# Patient Record
Sex: MALE | Race: WHITE | NOT HISPANIC OR LATINO | Employment: FULL TIME | URBAN - METROPOLITAN AREA
[De-identification: names, ages, dates, MRNs, and addresses within clinical notes are randomized per-mention and may not be internally consistent; named-entity substitution may affect disease eponyms.]

---

## 2023-06-27 PROBLEM — J06.9 VIRAL UPPER RESPIRATORY TRACT INFECTION: Status: ACTIVE | Noted: 2023-06-27

## 2023-06-27 PROBLEM — R07.9 CHEST PAIN: Status: ACTIVE | Noted: 2023-06-27

## 2023-06-27 PROBLEM — R19.5 CHANGE IN STOOL: Status: ACTIVE | Noted: 2023-06-27

## 2023-06-27 PROBLEM — A74.9 CHLAMYDIA: Status: ACTIVE | Noted: 2023-06-27

## 2023-06-27 PROBLEM — A64: Status: ACTIVE | Noted: 2023-06-27

## 2023-06-27 PROBLEM — E03.9 ACQUIRED HYPOTHYROIDISM: Status: ACTIVE | Noted: 2023-06-27

## 2023-06-27 PROBLEM — G43.909 HEADACHE, MIGRAINE: Status: ACTIVE | Noted: 2023-06-27

## 2023-06-27 PROBLEM — N48.9 PENILE LESION: Status: ACTIVE | Noted: 2023-06-27

## 2023-06-27 PROBLEM — R22.1 LUMP IN THROAT: Status: ACTIVE | Noted: 2023-06-27

## 2023-06-27 PROBLEM — L80 VITILIGO: Status: ACTIVE | Noted: 2023-06-27

## 2023-06-27 PROBLEM — R63.5 WEIGHT GAIN: Status: ACTIVE | Noted: 2023-06-27

## 2023-06-27 PROBLEM — A53.9 SYPHILIS: Status: ACTIVE | Noted: 2023-06-27

## 2023-06-27 PROBLEM — F32.A DEPRESSION: Status: ACTIVE | Noted: 2023-06-27

## 2023-06-29 ENCOUNTER — OFFICE VISIT (OUTPATIENT)
Dept: PRIMARY CARE | Facility: CLINIC | Age: 23
End: 2023-06-29
Payer: COMMERCIAL

## 2023-06-29 VITALS
DIASTOLIC BLOOD PRESSURE: 68 MMHG | OXYGEN SATURATION: 98 % | WEIGHT: 168 LBS | HEIGHT: 63 IN | SYSTOLIC BLOOD PRESSURE: 108 MMHG | BODY MASS INDEX: 29.77 KG/M2 | HEART RATE: 65 BPM | TEMPERATURE: 97.9 F

## 2023-06-29 DIAGNOSIS — R09.A2 SENSATION OF LUMP IN THROAT: Primary | ICD-10-CM

## 2023-06-29 DIAGNOSIS — R06.02 SOB (SHORTNESS OF BREATH): ICD-10-CM

## 2023-06-29 DIAGNOSIS — Z00.00 HEALTHCARE MAINTENANCE: ICD-10-CM

## 2023-06-29 DIAGNOSIS — F43.21 ADJUSTMENT DISORDER WITH DEPRESSED MOOD: ICD-10-CM

## 2023-06-29 PROCEDURE — 1036F TOBACCO NON-USER: CPT | Performed by: STUDENT IN AN ORGANIZED HEALTH CARE EDUCATION/TRAINING PROGRAM

## 2023-06-29 PROCEDURE — 99213 OFFICE O/P EST LOW 20 MIN: CPT | Performed by: STUDENT IN AN ORGANIZED HEALTH CARE EDUCATION/TRAINING PROGRAM

## 2023-06-29 ASSESSMENT — ENCOUNTER SYMPTOMS
SHORTNESS OF BREATH: 0
SORE THROAT: 0
VOMITING: 0
VOICE CHANGE: 0
TROUBLE SWALLOWING: 1
DYSURIA: 0
DIARRHEA: 0
SINUS PRESSURE: 0
SINUS PAIN: 0
RHINORRHEA: 0
NAUSEA: 0
DIZZINESS: 0
LIGHT-HEADEDNESS: 0

## 2023-06-29 ASSESSMENT — PATIENT HEALTH QUESTIONNAIRE - PHQ9
SUM OF ALL RESPONSES TO PHQ QUESTIONS 1-9: 13
10. IF YOU CHECKED OFF ANY PROBLEMS, HOW DIFFICULT HAVE THESE PROBLEMS MADE IT FOR YOU TO DO YOUR WORK, TAKE CARE OF THINGS AT HOME, OR GET ALONG WITH OTHER PEOPLE: SOMEWHAT DIFFICULT
8. MOVING OR SPEAKING SO SLOWLY THAT OTHER PEOPLE COULD HAVE NOTICED. OR THE OPPOSITE, BEING SO FIGETY OR RESTLESS THAT YOU HAVE BEEN MOVING AROUND A LOT MORE THAN USUAL: NOT AT ALL
2. FEELING DOWN, DEPRESSED OR HOPELESS: NEARLY EVERY DAY
7. TROUBLE CONCENTRATING ON THINGS, SUCH AS READING THE NEWSPAPER OR WATCHING TELEVISION: NOT AT ALL
1. LITTLE INTEREST OR PLEASURE IN DOING THINGS: NEARLY EVERY DAY
3. TROUBLE FALLING OR STAYING ASLEEP OR SLEEPING TOO MUCH: NOT AT ALL
5. POOR APPETITE OR OVEREATING: NEARLY EVERY DAY
4. FEELING TIRED OR HAVING LITTLE ENERGY: SEVERAL DAYS
6. FEELING BAD ABOUT YOURSELF - OR THAT YOU ARE A FAILURE OR HAVE LET YOURSELF OR YOUR FAMILY DOWN: NEARLY EVERY DAY
SUM OF ALL RESPONSES TO PHQ9 QUESTIONS 1 AND 2: 6
9. THOUGHTS THAT YOU WOULD BE BETTER OFF DEAD, OR OF HURTING YOURSELF: NOT AT ALL

## 2023-06-29 NOTE — ASSESSMENT & PLAN NOTE
He will let us know how he is doing with mood given his dad just  in February and he is dealing with the legal aftereffects of this.

## 2023-06-29 NOTE — ASSESSMENT & PLAN NOTE
Referred to ENT given the sensation of lump in the throat.  If work-up with ENT is largely unremarkable may need to refer to GI.

## 2023-06-29 NOTE — ASSESSMENT & PLAN NOTE
Seems to be related to airway irritation from marijuana use.  Advised to stop using marijuana.  Able to ambulate 2 flights of stairs without difficulty which is reassuring.  Let us know if this is persistent.

## 2023-06-29 NOTE — PROGRESS NOTES
"Subjective   Patient ID: Kaleb Hunt is a 23 y.o. male who presents for Sore Throat.  He is concerned about his throat and has hx enlarged tonsils. Now he feels lump in his throat and swallowing pill causes it to get caught. All swelling is in inside throat. Present for a few months. Sometimes food feels like it gets stuck at opening of mouth and proximal neck. No weight changes. Has FELDMAN and SOB when lying flat and uses marijuana before bed. No issues walking up 2 flights of stairs. No SI/HI.         Review of Systems   HENT:  Positive for trouble swallowing. Negative for hearing loss, postnasal drip, rhinorrhea, sinus pressure, sinus pain, sore throat, tinnitus and voice change.         No smell changes   Eyes:  Negative for visual disturbance.   Respiratory:  Negative for shortness of breath.    Cardiovascular:  Negative for chest pain.   Gastrointestinal:  Negative for diarrhea, nausea and vomiting.   Genitourinary:  Negative for dysuria.   Skin:  Negative for rash.   Neurological:  Negative for dizziness and light-headedness.       /68   Pulse 65   Temp 36.6 °C (97.9 °F)   Ht 1.607 m (5' 3.25\")   Wt 76.2 kg (168 lb)   SpO2 98%   BMI 29.53 kg/m²     Objective   Physical Exam  Vitals reviewed.   Constitutional:       General: He is not in acute distress.     Appearance: Normal appearance.   HENT:      Head: Normocephalic.      Right Ear: Tympanic membrane, ear canal and external ear normal. There is no impacted cerumen.      Left Ear: Tympanic membrane, ear canal and external ear normal. There is no impacted cerumen.      Mouth/Throat:      Mouth: Mucous membranes are moist.      Pharynx: Oropharynx is clear. No oropharyngeal exudate or posterior oropharyngeal erythema.      Comments: Bilateral enlarged tonsils.  Cardiovascular:      Rate and Rhythm: Normal rate and regular rhythm.   Pulmonary:      Effort: Pulmonary effort is normal. No respiratory distress.      Breath sounds: Normal breath " sounds.   Abdominal:      General: There is no distension.   Musculoskeletal:         General: No deformity.      Cervical back: Neck supple. No tenderness.   Lymphadenopathy:      Cervical: No cervical adenopathy.   Skin:     Coloration: Skin is not jaundiced.   Neurological:      Mental Status: He is alert.         Assessment/Plan   Problem List Items Addressed This Visit       Sensation of lump in throat - Primary     Referred to ENT given the sensation of lump in the throat.  If work-up with ENT is largely unremarkable may need to refer to GI.          Relevant Orders    Referral to ENT    Healthcare maintenance     Follow-up with me in 1 to 3 months for physical or sooner if needed.          Adjustment disorder with depressed mood      He will let us know how he is doing with mood given his dad just  in February and he is dealing with the legal aftereffects of this.          SOB (shortness of breath)     Seems to be related to airway irritation from marijuana use.  Advised to stop using marijuana.  Able to ambulate 2 flights of stairs without difficulty which is reassuring.  Let us know if this is persistent.

## 2023-10-04 ENCOUNTER — APPOINTMENT (OUTPATIENT)
Dept: PRIMARY CARE | Facility: CLINIC | Age: 23
End: 2023-10-04
Payer: COMMERCIAL

## 2023-11-06 ENCOUNTER — LAB (OUTPATIENT)
Dept: LAB | Facility: LAB | Age: 23
End: 2023-11-06
Payer: COMMERCIAL

## 2023-11-06 ENCOUNTER — OFFICE VISIT (OUTPATIENT)
Dept: PRIMARY CARE | Facility: CLINIC | Age: 23
End: 2023-11-06
Payer: COMMERCIAL

## 2023-11-06 VITALS
RESPIRATION RATE: 16 BRPM | HEART RATE: 83 BPM | SYSTOLIC BLOOD PRESSURE: 104 MMHG | DIASTOLIC BLOOD PRESSURE: 76 MMHG | WEIGHT: 163 LBS | HEIGHT: 63 IN | OXYGEN SATURATION: 98 % | BODY MASS INDEX: 28.88 KG/M2

## 2023-11-06 DIAGNOSIS — R22.2 MASS OF SKIN OF BACK: ICD-10-CM

## 2023-11-06 DIAGNOSIS — N50.89 TESTICULAR SWELLING: ICD-10-CM

## 2023-11-06 DIAGNOSIS — Z00.00 ROUTINE HEALTH MAINTENANCE: ICD-10-CM

## 2023-11-06 DIAGNOSIS — Z80.0 FAMILY HISTORY OF COLON CANCER IN MOTHER: Primary | ICD-10-CM

## 2023-11-06 DIAGNOSIS — F43.21 ADJUSTMENT DISORDER WITH DEPRESSED MOOD: ICD-10-CM

## 2023-11-06 LAB
ALBUMIN SERPL BCP-MCNC: 4.4 G/DL (ref 3.4–5)
ALP SERPL-CCNC: 66 U/L (ref 33–120)
ALT SERPL W P-5'-P-CCNC: 23 U/L (ref 10–52)
ANION GAP SERPL CALC-SCNC: 10 MMOL/L (ref 10–20)
AST SERPL W P-5'-P-CCNC: 22 U/L (ref 9–39)
BILIRUB SERPL-MCNC: 0.4 MG/DL (ref 0–1.2)
BUN SERPL-MCNC: 11 MG/DL (ref 6–23)
CALCIUM SERPL-MCNC: 9.7 MG/DL (ref 8.6–10.3)
CHLORIDE SERPL-SCNC: 102 MMOL/L (ref 98–107)
CHOLEST SERPL-MCNC: 197 MG/DL (ref 0–199)
CHOLESTEROL/HDL RATIO: 3.8
CO2 SERPL-SCNC: 27 MMOL/L (ref 21–32)
CREAT SERPL-MCNC: 0.8 MG/DL (ref 0.5–1.3)
ERYTHROCYTE [DISTWIDTH] IN BLOOD BY AUTOMATED COUNT: 12.8 % (ref 11.5–14.5)
GFR SERPL CREATININE-BSD FRML MDRD: >90 ML/MIN/1.73M*2
GLUCOSE SERPL-MCNC: 89 MG/DL (ref 74–99)
HCT VFR BLD AUTO: 45.8 % (ref 41–52)
HDLC SERPL-MCNC: 52.3 MG/DL
HGB BLD-MCNC: 15 G/DL (ref 13.5–17.5)
LDLC SERPL CALC-MCNC: 119 MG/DL
MCH RBC QN AUTO: 29.2 PG (ref 26–34)
MCHC RBC AUTO-ENTMCNC: 32.8 G/DL (ref 32–36)
MCV RBC AUTO: 89 FL (ref 80–100)
NON HDL CHOLESTEROL: 145 MG/DL (ref 0–149)
NRBC BLD-RTO: 0 /100 WBCS (ref 0–0)
PLATELET # BLD AUTO: 290 X10*3/UL (ref 150–450)
POTASSIUM SERPL-SCNC: 4.2 MMOL/L (ref 3.5–5.3)
PROT SERPL-MCNC: 7.5 G/DL (ref 6.4–8.2)
RBC # BLD AUTO: 5.14 X10*6/UL (ref 4.5–5.9)
SODIUM SERPL-SCNC: 135 MMOL/L (ref 136–145)
TRIGL SERPL-MCNC: 129 MG/DL (ref 0–149)
TSH SERPL-ACNC: 1.65 MIU/L (ref 0.44–3.98)
VLDL: 26 MG/DL (ref 0–40)
WBC # BLD AUTO: 10 X10*3/UL (ref 4.4–11.3)

## 2023-11-06 PROCEDURE — 1036F TOBACCO NON-USER: CPT | Performed by: STUDENT IN AN ORGANIZED HEALTH CARE EDUCATION/TRAINING PROGRAM

## 2023-11-06 PROCEDURE — 80053 COMPREHEN METABOLIC PANEL: CPT

## 2023-11-06 PROCEDURE — 36415 COLL VENOUS BLD VENIPUNCTURE: CPT

## 2023-11-06 PROCEDURE — 85027 COMPLETE CBC AUTOMATED: CPT

## 2023-11-06 PROCEDURE — 84443 ASSAY THYROID STIM HORMONE: CPT

## 2023-11-06 PROCEDURE — 80061 LIPID PANEL: CPT

## 2023-11-06 PROCEDURE — 99214 OFFICE O/P EST MOD 30 MIN: CPT | Performed by: STUDENT IN AN ORGANIZED HEALTH CARE EDUCATION/TRAINING PROGRAM

## 2023-11-06 ASSESSMENT — ENCOUNTER SYMPTOMS
SHORTNESS OF BREATH: 0
CHILLS: 0
VOMITING: 0
LIGHT-HEADEDNESS: 0
FREQUENCY: 0
HEMATURIA: 0
FATIGUE: 1
DYSURIA: 0
DIZZINESS: 0
UNEXPECTED WEIGHT CHANGE: 1
DIARRHEA: 1
WEAKNESS: 0
NAUSEA: 0
DIAPHORESIS: 0
FEVER: 0

## 2023-11-06 NOTE — PROGRESS NOTES
"Subjective   Patient ID: Klaeb Hunt is a 23 y.o. male who presents for Mass.    Pt has c.o a mass on his right lower back. He states that there is pain when he presses on the area. Pt noticed this 2 weeks ago. He was doing more heavy lifting at work and was hoping this would go away. Also reports hx of fatty lump on left upper arm. It is a right lower lumbar region. No weakness in legs. Sometimes left arm feels intermittently numb, no pain. No numbness in the legs. If sitting legs crossed he will notice numbness. Doesn't like eating during work and eats after work larger amounts. No melena, no hematochezia.     Has also noticed testicular swelling and prominence that has been chronic for a few years and unchanged. No pain in testes and no other symptoms he is aware of. Denies any new STI concerns today.     Sleeps 6-7 hours of sleep at night, doesn't wake up at night.          Review of Systems   Constitutional:  Positive for fatigue and unexpected weight change (weight loss-usually unintentional, eating less than usual). Negative for chills, diaphoresis and fever.   HENT:  Negative for hearing loss.    Eyes:  Negative for visual disturbance.   Respiratory:  Negative for shortness of breath.    Cardiovascular:  Negative for chest pain.   Gastrointestinal:  Positive for diarrhea (related to change in eating habits.). Negative for nausea and vomiting.   Endocrine: Negative for cold intolerance and heat intolerance.   Genitourinary:  Positive for scrotal swelling. Negative for dysuria, frequency, hematuria, penile discharge, penile pain, penile swelling, testicular pain and urgency.   Skin:  Negative for rash.   Neurological:  Negative for dizziness, weakness and light-headedness.       Objective   /76   Pulse 83   Resp 16   Ht 1.6 m (5' 3\")   Wt 73.9 kg (163 lb)   SpO2 98%   BMI 28.87 kg/m²     Physical Exam  Vitals reviewed. Chaperone present: Offered and he politely declined.   Constitutional:     "   General: He is not in acute distress.     Appearance: Normal appearance.   HENT:      Head: Normocephalic.   Cardiovascular:      Rate and Rhythm: Normal rate and regular rhythm.   Pulmonary:      Effort: Pulmonary effort is normal. No respiratory distress.      Breath sounds: Normal breath sounds.   Abdominal:      General: There is no distension.   Genitourinary:     Penis: Normal.       Testes: Normal.      Comments: Mild prominence of testes bilaterally, no discrete masses palpated. No genital lesions or discharge present on exam. No inguinal hernias present bilaterally.   Musculoskeletal:         General: No deformity.   Skin:     Coloration: Skin is not jaundiced.      Comments: 2cm soft and somewhat mobile mass of right lateral lumbar region. No crepitus, no fluctuance,  no erythema.   Neurological:      Mental Status: He is alert.         Assessment/Plan   Problem List Items Addressed This Visit       Adjustment disorder with depressed mood    Testicular swelling    Relevant Orders    US scrotum    Mass of skin of back    Relevant Orders    CT abdomen pelvis w and wo IV contrast    Routine health maintenance    Relevant Orders    Lipid Panel (Completed)    Comprehensive Metabolic Panel (Completed)    CBC (Completed)    TSH with reflex to Free T4 if abnormal (Completed)    Family history of colon cancer in mother - Primary    Relevant Orders    Referral to Gastroenterology     Scrotal swelling  -Likely physiologic-will get u/s scrotum to confirm.   -ER for testicular pain or worsening symptoms.     Low back mass  -Discussed seems c/w lipoma, will get CT to confirm. Discussed could watch and see or get ultrasound though he is interested in CT given family hx.  Sensation of lump in throat  -Previously referred to ENT, these symptoms actually resolved.     Family history of colon cancer  -Referred to GI previously, re referred today    Adjustment disorder  -Mood is better since after his dad passed. Will let  us notice of any concerns here.    SOB  -Advised to stop marijuana previously   -Resolved.     F/u 1 month CPE  Labs ordered.     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 Living at home with his mom. Mom had colon cancer, had surgery and was benign, doing well.

## 2023-11-08 DIAGNOSIS — E87.1 HYPONATREMIA: Primary | ICD-10-CM

## 2023-11-28 ENCOUNTER — HOSPITAL ENCOUNTER (OUTPATIENT)
Dept: RADIOLOGY | Facility: HOSPITAL | Age: 23
Discharge: HOME | End: 2023-11-28
Payer: COMMERCIAL

## 2023-11-28 DIAGNOSIS — R22.2 MASS OF SKIN OF BACK: ICD-10-CM

## 2023-11-28 PROCEDURE — 74177 CT ABD & PELVIS W/CONTRAST: CPT

## 2023-11-28 PROCEDURE — 74177 CT ABD & PELVIS W/CONTRAST: CPT | Performed by: RADIOLOGY

## 2023-11-28 PROCEDURE — 2550000001 HC RX 255 CONTRASTS: Performed by: STUDENT IN AN ORGANIZED HEALTH CARE EDUCATION/TRAINING PROGRAM

## 2023-11-28 RX ADMIN — IOHEXOL 75 ML: 350 INJECTION, SOLUTION INTRAVENOUS at 18:21

## 2023-12-06 ENCOUNTER — APPOINTMENT (OUTPATIENT)
Dept: PRIMARY CARE | Facility: CLINIC | Age: 23
End: 2023-12-06
Payer: COMMERCIAL